# Patient Record
Sex: FEMALE | Race: ASIAN | Employment: UNEMPLOYED | URBAN - METROPOLITAN AREA
[De-identification: names, ages, dates, MRNs, and addresses within clinical notes are randomized per-mention and may not be internally consistent; named-entity substitution may affect disease eponyms.]

---

## 2022-09-07 RX ORDER — LEVOTHYROXINE SODIUM 0.1 MG/1
100 TABLET ORAL DAILY
COMMUNITY

## 2022-09-07 RX ORDER — PANTOPRAZOLE SODIUM 40 MG/1
40 TABLET, DELAYED RELEASE ORAL DAILY
COMMUNITY

## 2022-09-07 NOTE — PROGRESS NOTES
PAT completed with daughter orders placed per MD, daughter states she and spouse with be with patient day of procedure patient will need . Danielito Garcia RN

## 2022-09-07 NOTE — PROGRESS NOTES
..1.  Do not eat or drink anything after 12 midnight prior to surgery. This includes no water, chewing gum or mints, except for bowel prep complete per MD.  2.  Take the following pills with a small sip of water on the morning of surgery 09/12/2022.  3.  Aspirin, Ibuprofen, Advil, Naproxen, Vitamin E and other Anti-inflammatory products should be stopped for 5 days before surgery or as directed by your physician. 4.  Check with your doctor regarding stopping Plavix, Coumadin, Lovenox, Fragmin or other blood thinners. 5.  Do not smoke and do not drink alcoholic beverages 24 hours prior to surgery. This includes NA Beer. 6.  You may brush your teeth and gargle the morning of surgery. DO NOT SWALLOW WATER.  7.  You MUST make arrangements for a responsible adult to take you home after your surgery. You will not be allowed to leave alone or drive yourself home. It is strongly suggested someone stay with you the first 24 hours. Your surgery will be cancelled if you do not have a ride home. 8.  A parent/legal guardian must accompany a child scheduled for surgery and plan to stay at the hospital until the child is discharged. Please do not bring other children with you. 9.  Please wear simple, loose fitting clothing to the hospital.  [address] not bring valuables ( money, credit cards, checkbooks, etc.)  Do not wear any makeup (including no eye makeup) or nail polish on your fingers or toes. 10.  Do not wear any jewelry or piercing on the day of surgery. All body piercing jewelry must be removed. 11.  If you have dentures, they will be removed before going to the OR; we will provide you a container. If you wear contact lenses or glasses, they will be removed; please bring a case for them.   15.  Notify your Surgeon if you develop any illness between now and surgery time; cough, cold, fever, sore throat, nausea, vomiting, etc.  Please notify your surgeon if you experience dizziness, shortness of breath or blurred vision between now and the time of your surgery. To provide excellent care, visitors will be limited to two in a room at any given time. Please no children under the age of 15 in the surgical department.

## 2022-09-12 ENCOUNTER — ANESTHESIA EVENT (OUTPATIENT)
Dept: ENDOSCOPY | Age: 62
End: 2022-09-12
Payer: COMMERCIAL

## 2022-09-12 ENCOUNTER — ANESTHESIA (OUTPATIENT)
Dept: ENDOSCOPY | Age: 62
End: 2022-09-12
Payer: COMMERCIAL

## 2022-09-12 ENCOUNTER — HOSPITAL ENCOUNTER (OUTPATIENT)
Age: 62
Setting detail: OUTPATIENT SURGERY
Discharge: HOME | End: 2022-09-12
Attending: INTERNAL MEDICINE | Admitting: INTERNAL MEDICINE
Payer: COMMERCIAL

## 2022-09-12 VITALS
HEART RATE: 73 BPM | SYSTOLIC BLOOD PRESSURE: 152 MMHG | DIASTOLIC BLOOD PRESSURE: 90 MMHG | BODY MASS INDEX: 37.3 KG/M2 | OXYGEN SATURATION: 100 % | RESPIRATION RATE: 16 BRPM | HEIGHT: 60 IN | TEMPERATURE: 97.1 F | WEIGHT: 190 LBS

## 2022-09-12 DIAGNOSIS — D64.9 ANEMIA, UNSPECIFIED TYPE: ICD-10-CM

## 2022-09-12 PROCEDURE — 7100000011 HC PHASE II RECOVERY - ADDTL 15 MIN: Performed by: INTERNAL MEDICINE

## 2022-09-12 PROCEDURE — 2500000003 HC RX 250 WO HCPCS: Performed by: NURSE ANESTHETIST, CERTIFIED REGISTERED

## 2022-09-12 PROCEDURE — 3609027000 HC COLONOSCOPY: Performed by: INTERNAL MEDICINE

## 2022-09-12 PROCEDURE — 88342 IMHCHEM/IMCYTCHM 1ST ANTB: CPT

## 2022-09-12 PROCEDURE — 2720000010 HC SURG SUPPLY STERILE: Performed by: INTERNAL MEDICINE

## 2022-09-12 PROCEDURE — 2709999900 HC NON-CHARGEABLE SUPPLY: Performed by: INTERNAL MEDICINE

## 2022-09-12 PROCEDURE — 2580000003 HC RX 258: Performed by: NURSE ANESTHETIST, CERTIFIED REGISTERED

## 2022-09-12 PROCEDURE — 3609019000 HC EGD CAPSULE ENDOSCOPY: Performed by: INTERNAL MEDICINE

## 2022-09-12 PROCEDURE — 3609012400 HC EGD TRANSORAL BIOPSY SINGLE/MULTIPLE: Performed by: INTERNAL MEDICINE

## 2022-09-12 PROCEDURE — 3700000001 HC ADD 15 MINUTES (ANESTHESIA): Performed by: INTERNAL MEDICINE

## 2022-09-12 PROCEDURE — 6360000002 HC RX W HCPCS: Performed by: NURSE ANESTHETIST, CERTIFIED REGISTERED

## 2022-09-12 PROCEDURE — 7100000010 HC PHASE II RECOVERY - FIRST 15 MIN: Performed by: INTERNAL MEDICINE

## 2022-09-12 PROCEDURE — 88305 TISSUE EXAM BY PATHOLOGIST: CPT

## 2022-09-12 PROCEDURE — 6360000002 HC RX W HCPCS: Performed by: INTERNAL MEDICINE

## 2022-09-12 PROCEDURE — 3700000000 HC ANESTHESIA ATTENDED CARE: Performed by: INTERNAL MEDICINE

## 2022-09-12 RX ORDER — PROPOFOL 10 MG/ML
INJECTION, EMULSION INTRAVENOUS PRN
Status: DISCONTINUED | OUTPATIENT
Start: 2022-09-12 | End: 2022-09-12 | Stop reason: SDUPTHER

## 2022-09-12 RX ORDER — ONDANSETRON 2 MG/ML
4 INJECTION INTRAMUSCULAR; INTRAVENOUS ONCE
Status: COMPLETED | OUTPATIENT
Start: 2022-09-12 | End: 2022-09-12

## 2022-09-12 RX ORDER — SODIUM CHLORIDE, SODIUM LACTATE, POTASSIUM CHLORIDE, CALCIUM CHLORIDE 600; 310; 30; 20 MG/100ML; MG/100ML; MG/100ML; MG/100ML
INJECTION, SOLUTION INTRAVENOUS CONTINUOUS PRN
Status: DISCONTINUED | OUTPATIENT
Start: 2022-09-12 | End: 2022-09-12 | Stop reason: SDUPTHER

## 2022-09-12 RX ORDER — LIDOCAINE HYDROCHLORIDE 20 MG/ML
INJECTION, SOLUTION INFILTRATION; PERINEURAL PRN
Status: DISCONTINUED | OUTPATIENT
Start: 2022-09-12 | End: 2022-09-12 | Stop reason: SDUPTHER

## 2022-09-12 RX ORDER — SODIUM CHLORIDE, SODIUM LACTATE, POTASSIUM CHLORIDE, CALCIUM CHLORIDE 600; 310; 30; 20 MG/100ML; MG/100ML; MG/100ML; MG/100ML
INJECTION, SOLUTION INTRAVENOUS CONTINUOUS
Status: DISCONTINUED | OUTPATIENT
Start: 2022-09-12 | End: 2022-09-12 | Stop reason: HOSPADM

## 2022-09-12 RX ORDER — ONDANSETRON 2 MG/ML
INJECTION INTRAMUSCULAR; INTRAVENOUS
Status: DISCONTINUED
Start: 2022-09-12 | End: 2022-09-12 | Stop reason: HOSPADM

## 2022-09-12 RX ADMIN — SODIUM CHLORIDE, POTASSIUM CHLORIDE, SODIUM LACTATE AND CALCIUM CHLORIDE: 600; 310; 30; 20 INJECTION, SOLUTION INTRAVENOUS at 08:54

## 2022-09-12 RX ADMIN — LIDOCAINE HYDROCHLORIDE 60 MG: 20 INJECTION, SOLUTION INFILTRATION; PERINEURAL at 09:03

## 2022-09-12 RX ADMIN — ONDANSETRON HYDROCHLORIDE 4 MG: 2 INJECTION, SOLUTION INTRAMUSCULAR; INTRAVENOUS at 10:23

## 2022-09-12 RX ADMIN — PROPOFOL 260 MG: 10 INJECTION, EMULSION INTRAVENOUS at 09:03

## 2022-09-12 ASSESSMENT — PAIN - FUNCTIONAL ASSESSMENT: PAIN_FUNCTIONAL_ASSESSMENT: 0-10

## 2022-09-12 NOTE — PROGRESS NOTES
[facility] VSS with sp02 100%. Pt denies pain and nausea. Family at bedside. Will continue to monitor.

## 2022-09-12 NOTE — ANESTHESIA PRE PROCEDURE
Department of Anesthesiology  Preprocedure Note       Name:  Shavonne Lorenz   Age:  60 y.o.  :  1960                                          MRN:  [id number]         Date:  2022      Surgeon: Familia Moncada):  Sarah Beth Rivero MD    Procedure: Procedure(s):  EGD Zafar Gruber. (9:15)  COLON W/ANES. Medications prior to admission:   Prior to Admission medications    Medication Sig Start Date End Date Taking? Authorizing Provider   levothyroxine (SYNTHROID) 100 MCG tablet Take 100 mcg by mouth Daily   Yes Historical Provider, MD   pantoprazole (PROTONIX) 40 MG tablet Take 40 mg by mouth daily   Yes Historical Provider, MD       Current medications:    No current facility-administered medications for this encounter. Current Outpatient Medications   Medication Sig Dispense Refill    levothyroxine (SYNTHROID) 100 MCG tablet Take 100 mcg by mouth Daily      pantoprazole (PROTONIX) 40 MG tablet Take 40 mg by mouth daily         Allergies:  No Known Allergies    Problem List:  There is no problem list on file for this patient. Past Medical History:        Diagnosis Date    GERD (gastroesophageal reflux disease)     Hypertension     Iron deficiency     Peptic ulcer     Thyroid disease        Past Surgical History:        Procedure Laterality Date    CYST REMOVAL         Social History:    Social History     Tobacco Use    Smoking status: Never    Smokeless tobacco: Never   Substance Use Topics    Alcohol use: Never                                Counseling given: Not Answered      Vital Signs (Current):   Vitals:    22 1105   Weight: 190 lb (86.2 kg)   Height: 5' (1.524 m)                                              BP Readings from Last 3 Encounters:   No data found for BP       NPO Status:                                                                                 BMI:   Wt Readings from Last 3 Encounters:   No data found for Wt     Body mass index is 37.11 kg/m².     CBC: No results found for: WBC, RBC, HGB, HCT, MCV, RDW, PLT    CMP: No results found for: NA, K, CL, CO2, BUN, CREATININE, GFRAA, AGRATIO, LABGLOM, GLUCOSE, GLU, PROT, CALCIUM, BILITOT, ALKPHOS, AST, ALT    POC Tests: No results for input(s): POCGLU, POCNA, POCK, POCCL, POCBUN, POCHEMO, POCHCT in the last 72 hours. Coags: No results found for: PROTIME, INR, APTT    HCG (If Applicable): No results found for: PREGTESTUR, PREGSERUM, HCG, HCGQUANT     ABGs: No results found for: PHART, PO2ART, NOS8CAF, XRY3NOV, BEART, F5KPNYRO     Type & Screen (If Applicable):  No results found for: LABABO, LABRH    Drug/Infectious Status (If Applicable):  No results found for: HIV, HEPCAB    COVID-19 Screening (If Applicable): No results found for: COVID19        Anesthesia Evaluation  Patient summary reviewed and Nursing notes reviewed no history of anesthetic complications:   Airway: Mallampati: II     Neck ROM: full     Dental:          Pulmonary:Negative Pulmonary ROS and normal exam                               Cardiovascular:Negative CV ROS    (+) hypertension:,                   Neuro/Psych:   Negative Neuro/Psych ROS              GI/Hepatic/Renal: Neg GI/Hepatic/Renal ROS  (+) GERD: well controlled, PUD,      (-) hiatal hernia       Endo/Other: Negative Endo/Other ROS                    Abdominal:             Vascular: Other Findings:           Anesthesia Plan      general     ASA 2     (I discussed with the patient the risks and benefits of PIV, general anesthesia, IV Narcotics, PACU. All questions were answered the patient agrees with the plan and wishes to proceed.  )  Induction: intravenous. Pre-Operative Diagnosis: Anemia, unspecified type [D64.9]    60 y.o.   BMI:  Body mass index is 37.11 kg/m².      Vitals:    09/07/22 1105 09/12/22 0829   BP:  136/74   Pulse:  83   Resp:  18   Temp:  98.7 °F (37.1 °C)   TempSrc:  Temporal   SpO2:  98%   Weight: 190 lb (86.2 kg) 190 lb (86.2 kg)   Height: 5' (1.524 m) 5' (1.524 m)       No Known Allergies    Social History     Tobacco Use    Smoking status: Never    Smokeless tobacco: Never   Substance Use Topics    Alcohol use: Never       LABS:    CBC  No results found for: WBC, HGB, HCT, PLT  RENAL  No results found for: NA, K, CL, CO2, BUN, CREATININE, GLUCOSE  COAGS  No results found for: PROTIME, INR, APTT      Justin Carroll MD   9/12/2022

## 2022-09-12 NOTE — PROGRESS NOTES
Small bowel capsule endoscopy began at 1000. Patient swallowed pill cam with no difficulty with about 250 mL of water. Patient and daughter verbalized understanding of instructions regarding the monitor. Patient can drink clear liquids after 2 hours, and can eat a light snack after 4 hours. Patient and daughter verbalized that they will record any ingestion of food or drink and when the light on the monitor changes to yellow. Unit phone number provided for any questions after discharge.      Pill cam Lot #[id number]

## 2022-09-12 NOTE — PROCEDURES
EGD and Colonoscopy Procedure  Note            Patient: Amanda Jiang  : 1960  CSN:     Procedure: Esophagogastroduodenoscopy with colonoscopy    Date:  2022     Surgeon:  Anita Sánchez MD     Referring Provider:  Dr. Channing Ortiz    Preoperative Diagnosis:  Anemia, epigastric pain, dyspepsia, screening for colon cancer    Postoperative Diagnosis:  Normal colonoscopy, mild gastritis    Anesthesia:  Propofol    EBL: <50 mL    Indications: This is a 60y.o. year old female who presents today with Unexplained iron deficiency anaemia. Screening for colon cancer. Procedure Details:    With the patient in left lateral position the endoscope was passed through the hypopharynx into the esophagus. The scope was advanced all the way to the duodenum. The mucosa in the duodenal bulb, post bulbar region in the descending duodenum appeared normal.  The mucosa in the antrum appeared erythematous, biopsies were taken to rule out Hpylori bacteria  The mucosa in the remaining part of the stomach and retroflexion appeared normal.  The GE junction was at around 35cms. The mucosa in the remainder of the esophagus appeared normal.  The scope was withdrawn and the procedure was terminated. Gastric or Duodenal ulcer present: No    Procedure Details:    With the patient in left lateral decubitus position and a digital rectal examination was performed, no abnormalities noted. The scope was advanced all the way to the cecum, the mucosa appeared normal.  Appendix was identified. The terminal ileum was briefly intubated, the mucosa appeared normal.  The mucosa in the ascending, transverse, descending, sigmoid and rectum appeared normal.  On retroflexion no abnormalities were noted. Scope was withdrawn and the procedure was terminated. Impression:  Mild gastritis, normal colonoscopy    Recommendations:   No cause for anemia was seen on the patient's endoscopy or colonoscopy today other then mild gastritis. Suggest the patient avoid all NSAIDS. We will arrange a video capsule endoscopy to rule out any small bowel cause of anemia. Addendum: Patient called with egd and colonoscopy and capsule results. I have asked the patient to repeat colonoscopy in 10 years. No GI cause of bleeding seen.     Char March MD, MD   [address]  9/12/2022

## 2022-09-12 NOTE — ANESTHESIA POSTPROCEDURE EVALUATION
Department of Anesthesiology  Postprocedure Note    Patient: Shahla Graf  MRN: [id number]  YOB: 1960  Date of evaluation: 9/12/2022      Procedure Summary     Date: 09/12/22 Room / Location: [place] [address] 01 / [facility]    Anesthesia Start: 5968 Anesthesia Stop: 0933    Procedures:       EGD BIOPSY      COLON W/ANES. BOWEL SMALL CAPSULE ENDOSCOPY Diagnosis:       Anemia, unspecified type      (ANEMIA)    Surgeons: Arcadio Duggan MD Responsible Provider: Bertin Phelps MD    Anesthesia Type: general ASA Status: 2          Anesthesia Type: No value filed. Tanner Phase I: Tanner Score: 10    Tanner Phase II: Tanner Score: 9      Anesthesia Post Evaluation    Comments: Postoperative Anesthesia Note    Name:    Shahla Graf  MRN:      [id number]    Patient Vitals in the past 12 hrs:  09/12/22 0949, BP:(!) 152/90, Pulse:73, Resp:16, SpO2:100 %  09/12/22 0935, BP:107/62, Temp:97.1 °F (36.2 °C), Temp src:Infrared, Pulse:77, Resp:16, SpO2:100 %  09/12/22 0829, BP:136/74, Temp:98.7 °F (37.1 °C), Temp src:Temporal, Pulse:83, Resp:18, SpO2:98 %, Height:5' (1.524 m), Weight:190 lb (86.2 kg)     LABS:    CBC  No results found for: WBC, HGB, HCT, PLT  RENAL  No results found for: NA, K, CL, CO2, BUN, CREATININE, GLUCOSE  COAGS  No results found for: PROTIME, INR, APTT    Intake & Output:  @40OIKC@    Nausea & Vomiting:  No    Level of Consciousness:  Awake    Pain Assessment:  Adequate analgesia    Anesthesia Complications:  No apparent anesthetic complications    SUMMARY      Vital signs stable  OK to discharge from Stage I post anesthesia care.   Care transferred from Anesthesiology department on discharge from perioperative area

## 2022-09-21 DIAGNOSIS — D50.9 IRON DEFICIENCY ANEMIA, UNSPECIFIED IRON DEFICIENCY ANEMIA TYPE: Primary | ICD-10-CM

## 2022-09-21 NOTE — PROCEDURES
Video Capsule Endoscopy Note    Patient: Abigail Hernandez  : 1960  CSN:     Procedure: Small bowel capsule endoscopy    Date:  2022     Surgeon:  Renetta Cunningham MD     Referring Provider:  Dr. Leno Barrios    Preoperative Diagnosis:  Iron deficiency anemia    Postoperative Diagnosis:  Dark fluid in ileum somewhat obstructs view otherwise normal small bowel endoscopy      Indications: This is a 60y.o. year old female who presents today with Unexplained iron deficiency anaemia. Description of Procedure:  Informed consent was obtained from the patient after explanation of indications, benefits and possible risks and complications of the procedure. The patient was then taken to the endoscopy suite, placed in the left lateral decubitus position and the above IV sedation was administrered. The first gastric, first duodenal and first cecal images were identified. The total capsule duration was four hours and thirty two minutes. The biopsy site was identified in stomach. No active bleeding was visualized anywhere in the small bowel. No ulcers, erosions or masses were seen. Dark fluid was visualized in the ileum which somewhat obstructed view but the capsule was otherwise unremarkable for causes of anemia     Impression:  -Negative video capsule endoscopy study for iron deficiency anemia. Recommendations: Suggest the patient avoid any NSAIDS, replace iron as necessary. Suggest hematology consultation for workup of anemia.     Renetta Cunningham MD, MD Terry Perkins  [phone]

## 2022-10-06 NOTE — H&P
Gastroenterology Note             Pre-operative History and Physical    Patient: Babak Laureano  : 1960  CSN:     History Obtained From:  patient and/or guardian. HISTORY OF PRESENT ILLNESS:    The patient is a 60 y.o. female  here for colonoscopy. Past Medical History:    Past Medical History:   Diagnosis Date    GERD (gastroesophageal reflux disease)     Hypertension     Iron deficiency     Peptic ulcer     Thyroid disease      Past Surgical History:    Past Surgical History:   Procedure Laterality Date    CAPSULE ENDOSCOPY  2022    BOWEL SMALL CAPSULE ENDOSCOPY performed by Sathya Briones MD at [address] [facility]  2022    COLON W/ANES. performed by Sathya Briones MD at [address] ENDOSCOPY N/A 2022    EGD BIOPSY performed by Sathya Briones MD at [address] [facility]     Medications Prior to Admission:   No current facility-administered medications on file prior to encounter. Current Outpatient Medications on File Prior to Encounter   Medication Sig Dispense Refill    levothyroxine (SYNTHROID) 100 MCG tablet Take 100 mcg by mouth Daily      pantoprazole (PROTONIX) 40 MG tablet Take 40 mg by mouth daily          Allergies:  Patient has no known allergies. Social History:   Social History     Tobacco Use    Smoking status: Never    Smokeless tobacco: Never   Substance Use Topics    Alcohol use: Never     Family History:   History reviewed. No pertinent family history. PHYSICAL EXAM:      BP (!) 152/90   Pulse 73   Temp 97.1 °F (36.2 °C) (Infrared)   Resp 16   Ht 5' (1.524 m)   Wt 190 lb (86.2 kg)   SpO2 100%   BMI 37.11 kg/m²  I        Heart:   RRR, normal s1s2    Lungs:  CTA bilat,  Normal effort    Abdomen:   NT, ND      ASA Grade:  ASA 2 - Patient with mild systemic disease with no functional limitations    Mallampati Class: 2          ASSESSMENT AND PLAN:    1.   Patient is a 60 y.o. female here for Colonoscopy with MAC.   2.  Procedure options, risks and benefits reviewed with patient. Patient expresses understanding.     Mimi Gandhi MD,   [facility]  10/6/2022
